# Patient Record
Sex: FEMALE | Race: WHITE
[De-identification: names, ages, dates, MRNs, and addresses within clinical notes are randomized per-mention and may not be internally consistent; named-entity substitution may affect disease eponyms.]

---

## 2018-11-06 ENCOUNTER — HOSPITAL ENCOUNTER (OUTPATIENT)
Dept: HOSPITAL 92 - CTENTCT | Age: 68
Discharge: HOME | End: 2018-11-06
Attending: OTOLARYNGOLOGY
Payer: MEDICARE

## 2018-11-06 DIAGNOSIS — J32.9: Primary | ICD-10-CM

## 2018-11-06 PROCEDURE — 70486 CT MAXILLOFACIAL W/O DYE: CPT

## 2018-11-19 ENCOUNTER — HOSPITAL ENCOUNTER (EMERGENCY)
Dept: HOSPITAL 92 - ERS | Age: 68
Discharge: HOME | End: 2018-11-19
Payer: MEDICARE

## 2018-11-19 DIAGNOSIS — F17.210: ICD-10-CM

## 2018-11-19 DIAGNOSIS — I10: ICD-10-CM

## 2018-11-19 DIAGNOSIS — R07.81: ICD-10-CM

## 2018-11-19 DIAGNOSIS — E78.5: ICD-10-CM

## 2018-11-19 DIAGNOSIS — J44.1: Primary | ICD-10-CM

## 2018-11-19 DIAGNOSIS — F41.9: ICD-10-CM

## 2018-11-19 PROCEDURE — 96372 THER/PROPH/DIAG INJ SC/IM: CPT

## 2018-11-19 PROCEDURE — 94640 AIRWAY INHALATION TREATMENT: CPT

## 2018-11-19 NOTE — RAD
RIGHT RIBS FOUR VIEWS:

 

History: Cough, rib pain. 

 

FINDINGS: 

Lungs are clear. There is calcified granuloma in the upper right lung. 

 

No rib fracture or other rib lesion identified. 

 

IMPRESSION: 

No acute finding. 

 

POS: [facility]

## 2018-11-20 ENCOUNTER — HOSPITAL ENCOUNTER (EMERGENCY)
Dept: HOSPITAL 92 - ERS | Age: 68
Discharge: HOME | End: 2018-11-20
Payer: MEDICARE

## 2018-11-20 DIAGNOSIS — F41.9: ICD-10-CM

## 2018-11-20 DIAGNOSIS — K29.00: ICD-10-CM

## 2018-11-20 DIAGNOSIS — I10: ICD-10-CM

## 2018-11-20 DIAGNOSIS — F17.210: ICD-10-CM

## 2018-11-20 DIAGNOSIS — Z79.899: ICD-10-CM

## 2018-11-20 DIAGNOSIS — J44.9: ICD-10-CM
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
## 2018-11-20 DIAGNOSIS — S23.41XA: Primary | ICD-10-CM

## 2018-11-20 DIAGNOSIS — E78.5: ICD-10-CM

## 2018-11-20 PROCEDURE — C9113 INJ PANTOPRAZOLE SODIUM, VIA: HCPCS

## 2018-11-20 PROCEDURE — 96361 HYDRATE IV INFUSION ADD-ON: CPT

## 2018-11-20 PROCEDURE — 96375 TX/PRO/DX INJ NEW DRUG ADDON: CPT

## 2018-11-20 PROCEDURE — 96365 THER/PROPH/DIAG IV INF INIT: CPT

## 2018-11-20 PROCEDURE — 96372 THER/PROPH/DIAG INJ SC/IM: CPT

## 2019-01-02 ENCOUNTER — HOSPITAL ENCOUNTER (OUTPATIENT)
Dept: HOSPITAL 92 - BICULT | Age: 69
Discharge: HOME | End: 2019-01-02
Attending: INTERNAL MEDICINE
Payer: MEDICARE

## 2019-01-02 DIAGNOSIS — N20.0: ICD-10-CM

## 2019-01-02 DIAGNOSIS — B19.20: Primary | ICD-10-CM

## 2019-01-02 PROCEDURE — 76700 US EXAM ABDOM COMPLETE: CPT

## 2019-01-02 NOTE — ULT
COMPLETE ABDOMEN SONOGRAM:

 

HISTORY:

Abdominal pain.  Abnormal liver function tests.

 

FINDINGS:

The gallbladder has a normal appearance.  The common duct is 0.6 cm.  The liver is unremarkable witho
ut focal mass or intrahepatic biliary dilatation.  No free fluid.  At the inferior pole of the left k
idney, an oval shadowing echogenicity measures up to 1.1 cm.  No hydronephrosis.  The spleen, right k
idney, and visualized portions of the abdominal aorta, IVC, and pancreas are unremarkable.

 

IMPRESSION:

1.  No evidence of gallstones or biliary obstruction.

 

2.  No significant hepatic abnormalities demonstrated.

 

3.  Nonobstructing left renal calculus.

 

POS: [facility]

## 2019-08-13 ENCOUNTER — HOSPITAL ENCOUNTER (OUTPATIENT)
Dept: HOSPITAL 92 - RAD | Age: 69
Discharge: HOME | End: 2019-08-13
Attending: INTERNAL MEDICINE
Payer: MEDICARE

## 2019-08-13 DIAGNOSIS — R06.00: Primary | ICD-10-CM

## 2019-08-13 PROCEDURE — 71046 X-RAY EXAM CHEST 2 VIEWS: CPT

## 2019-08-13 NOTE — RAD
EXAM:

Chest 2 views:



HISTORY:

Dyspnea



COMPARISON:

None.



FINDINGS:

There is a normal-sized cardiomediastinal silhouette. Calcified granuloma projects over the right upp
er lobe.  No focal infiltrates or pleural effusion are seen. The patient has bilateral calcified

breast implants.



IMPRESSION:

No evidence of acute cardiopulmonary disease



Reported By: Rajesh Brice 

Electronically Signed:  8/13/2019 9:50 AM

## 2020-02-05 ENCOUNTER — HOSPITAL ENCOUNTER (OUTPATIENT)
Dept: HOSPITAL 92 - RAD | Age: 70
Discharge: HOME | End: 2020-02-05
Attending: INTERNAL MEDICINE
Payer: MEDICARE

## 2020-02-05 DIAGNOSIS — R06.00: Primary | ICD-10-CM

## 2020-02-05 DIAGNOSIS — J43.9: ICD-10-CM

## 2020-02-05 PROCEDURE — 71046 X-RAY EXAM CHEST 2 VIEWS: CPT

## 2020-02-05 NOTE — RAD
RADIOGRAPH CHEST 2 VIEW:



DATE:

2/5/2020



HISTORY:

69-year-old female with dyspnea



FINDINGS:

There is hyperinflation of the lungs, consistent with COPD. There is no evidence of airspace density,
 pulmonary edema, cardiomegaly, pleural effusion, or pneumothorax. Old right posterolateral upper

rib fracture deformities. Right upper lobe calcified granuloma. Calcified bilateral breast implants.



IMPRESSION:

1) No acute cardiopulmonary findings.

2) emphysema.



Reported By: Darvin Emerson 

Electronically Signed:  2/5/2020 9:07 AM